# Patient Record
Sex: FEMALE | Race: ASIAN | NOT HISPANIC OR LATINO | Employment: FULL TIME | URBAN - METROPOLITAN AREA
[De-identification: names, ages, dates, MRNs, and addresses within clinical notes are randomized per-mention and may not be internally consistent; named-entity substitution may affect disease eponyms.]

---

## 2023-04-19 ENCOUNTER — OFFICE VISIT (OUTPATIENT)
Dept: FAMILY MEDICINE | Facility: CLINIC | Age: 48
End: 2023-04-19
Payer: COMMERCIAL

## 2023-04-19 ENCOUNTER — TELEPHONE (OUTPATIENT)
Dept: FAMILY MEDICINE | Facility: CLINIC | Age: 48
End: 2023-04-19

## 2023-04-19 ENCOUNTER — NURSE TRIAGE (OUTPATIENT)
Dept: FAMILY MEDICINE | Facility: CLINIC | Age: 48
End: 2023-04-19

## 2023-04-19 VITALS
HEIGHT: 65 IN | RESPIRATION RATE: 12 BRPM | SYSTOLIC BLOOD PRESSURE: 144 MMHG | TEMPERATURE: 97.9 F | DIASTOLIC BLOOD PRESSURE: 83 MMHG | HEART RATE: 80 BPM | BODY MASS INDEX: 25.36 KG/M2 | OXYGEN SATURATION: 98 % | WEIGHT: 152.2 LBS

## 2023-04-19 DIAGNOSIS — R51.9 RIGHT-SIDED HEADACHE: Primary | ICD-10-CM

## 2023-04-19 DIAGNOSIS — D64.9 ANEMIA, UNSPECIFIED TYPE: ICD-10-CM

## 2023-04-19 DIAGNOSIS — R03.0 ELEVATED BLOOD PRESSURE READING WITHOUT DIAGNOSIS OF HYPERTENSION: ICD-10-CM

## 2023-04-19 DIAGNOSIS — Z13.1 SCREENING FOR DIABETES MELLITUS: ICD-10-CM

## 2023-04-19 DIAGNOSIS — Z13.29 SCREENING FOR THYROID DISORDER: ICD-10-CM

## 2023-04-19 DIAGNOSIS — Z13.0 SCREENING FOR DEFICIENCY ANEMIA: ICD-10-CM

## 2023-04-19 LAB
BASOPHILS # BLD AUTO: 0 10E3/UL (ref 0–0.2)
BASOPHILS NFR BLD AUTO: 1 %
EOSINOPHIL # BLD AUTO: 0.1 10E3/UL (ref 0–0.7)
EOSINOPHIL NFR BLD AUTO: 2 %
ERYTHROCYTE [DISTWIDTH] IN BLOOD BY AUTOMATED COUNT: 18.4 % (ref 10–15)
HCT VFR BLD AUTO: 30.3 % (ref 35–47)
HGB BLD-MCNC: 8.6 G/DL (ref 11.7–15.7)
IMM GRANULOCYTES # BLD: 0 10E3/UL
IMM GRANULOCYTES NFR BLD: 0 %
LYMPHOCYTES # BLD AUTO: 1.5 10E3/UL (ref 0.8–5.3)
LYMPHOCYTES NFR BLD AUTO: 27 %
MCH RBC QN AUTO: 19.3 PG (ref 26.5–33)
MCHC RBC AUTO-ENTMCNC: 28.4 G/DL (ref 31.5–36.5)
MCV RBC AUTO: 68 FL (ref 78–100)
MONOCYTES # BLD AUTO: 0.4 10E3/UL (ref 0–1.3)
MONOCYTES NFR BLD AUTO: 7 %
NEUTROPHILS # BLD AUTO: 3.4 10E3/UL (ref 1.6–8.3)
NEUTROPHILS NFR BLD AUTO: 63 %
PLATELET # BLD AUTO: 340 10E3/UL (ref 150–450)
RBC # BLD AUTO: 4.45 10E6/UL (ref 3.8–5.2)
WBC # BLD AUTO: 5.5 10E3/UL (ref 4–11)

## 2023-04-19 PROCEDURE — 99204 OFFICE O/P NEW MOD 45 MIN: CPT | Performed by: PHYSICIAN ASSISTANT

## 2023-04-19 PROCEDURE — 82728 ASSAY OF FERRITIN: CPT | Performed by: PHYSICIAN ASSISTANT

## 2023-04-19 PROCEDURE — 36415 COLL VENOUS BLD VENIPUNCTURE: CPT | Performed by: PHYSICIAN ASSISTANT

## 2023-04-19 PROCEDURE — 83550 IRON BINDING TEST: CPT | Performed by: PHYSICIAN ASSISTANT

## 2023-04-19 PROCEDURE — 83540 ASSAY OF IRON: CPT | Performed by: PHYSICIAN ASSISTANT

## 2023-04-19 PROCEDURE — 80050 GENERAL HEALTH PANEL: CPT | Performed by: PHYSICIAN ASSISTANT

## 2023-04-19 RX ORDER — SUMATRIPTAN 50 MG/1
50 TABLET, FILM COATED ORAL
Qty: 9 TABLET | Refills: 0 | Status: SHIPPED | OUTPATIENT
Start: 2023-04-19

## 2023-04-19 ASSESSMENT — PAIN SCALES - GENERAL: PAINLEVEL: SEVERE PAIN (6)

## 2023-04-19 ASSESSMENT — ENCOUNTER SYMPTOMS: HEADACHES: 1

## 2023-04-19 NOTE — TELEPHONE ENCOUNTER
Phone call to patient - no answer. Left message to call back  hgb was only 8.6.   This is very low.  Start over the counter iron supplement twice a day with meals and recheck labs in 2 days- schedule lab only appointment in 2 days   Go to emergency department if lightheadedness, dizziness, or other change in symptoms.

## 2023-04-19 NOTE — TELEPHONE ENCOUNTER
This writer attempted to contact patient on 04/19/23      Reason for call results and left message.      If patient calls back:   Registered Nurse called. Relay provider message below      BRIAN Connors, RN  [facility]

## 2023-04-19 NOTE — TELEPHONE ENCOUNTER
Triaged headache symptoms, see below. Protocol advises to be seen today or tomorrow. Pt and daughter were agreeable to schedule with same day provider this afternoon. They had no further questions or concerns.     Reason for Disposition    Unexplained headache that is present > 24 hours    Additional Information    Negative: Difficult to awaken or acting confused (e.g., disoriented, slurred speech)    Negative: Weakness of the face, arm or leg on one side of the body and new-onset    Negative: Numbness of the face, arm or leg on one side of the body and new-onset    Negative: Loss of speech or garbled speech and new-onset    Negative: Passed out (i.e., fainted, collapsed and was not responding)    Negative: Sounds like a life-threatening emergency to the triager    Negative: Followed a head injury within last 3 days    Negative: Traumatic Brain Injury (TBI) is suspected    Negative: Sinus pain of forehead and yellow or green nasal discharge    Negative: Pregnant    Negative: Unable to walk without falling    Negative: Stiff neck (can't touch chin to chest)    Negative: Possibility of carbon monoxide exposure    Negative: SEVERE headache, states 'worst headache' of life    Negative: SEVERE headache, sudden-onset (i.e., reaching maximum intensity within seconds to 1 hour)    Negative: Severe pain in one eye    Negative: Loss of vision or double vision  (Exception: Same as prior migraines.)    Negative: Patient sounds very sick or weak to the triager    Negative: Fever > 103 F (39.4 C)    Negative: Fever > 100.0 F (37.8 C) and has diabetes mellitus or a weak immune system (e.g., HIV positive, cancer chemotherapy, organ transplant, splenectomy, chronic steroids)    Negative: SEVERE headache (e.g., excruciating) and has had severe headaches before    Negative: SEVERE headache and not relieved by pain meds    Negative: SEVERE headache and vomiting    Negative: SEVERE headache and fever    Negative: New headache and weak  "immune system (e.g., HIV positive, cancer chemo, splenectomy, organ transplant, chronic steroids)    Negative: Fever present > 3 days (72 hours)    Negative: Patient wants to be seen    Answer Assessment - Initial Assessment Questions  1. LOCATION: \"Where does it hurt?\"         Near the right ear    2. ONSET: \"When did the headache start?\" (Minutes, hours or days)         Started about 10 days ago    3. PATTERN: \"Does the pain come and go, or has it been constant since it started?\"        Come and goes    4. SEVERITY: \"How bad is the pain?\" and \"What does it keep you from doing?\"  (e.g., Scale 1-10; mild, moderate, or severe)    - MILD (1-3): doesn't interfere with normal activities     - MODERATE (4-7): interferes with normal activities or awakens from sleep     - SEVERE (8-10): excruciating pain, unable to do any normal activities           6/10    5. RECURRENT SYMPTOM: \"Have you ever had headaches before?\" If Yes, ask: \"When was the last time?\" and \"What happened that time?\"         Never had this before. Not taken anything for the pain yet.    6. CAUSE: \"What do you think is causing the headache?\"        Unsure    7. MIGRAINE: \"Have you been diagnosed with migraine headaches?\" If Yes, ask: \"Is this headache similar?\"         No    8. HEAD INJURY: \"Has there been any recent injury to the head?\"         No    9. OTHER SYMPTOMS: \"Do you have any other symptoms?\" (fever, stiff neck, eye pain, sore throat, cold symptoms)        Initially she had ear pain in both but this has been going on much longer and this headache.    10. PREGNANCY: \"Is there any chance you are pregnant?\" \"When was your last menstrual period?\"          NA    Protocols used: HEADACHE-A-OH    SEE IN OFFICE TODAY OR TOMORROW:   * You need to be examined. Let me give you an appointment.  * IF NO AVAILABLE OFFICE APPOINTMENTS: You need to be seen in an Urgent Care Center. Go there today. A nearby Urgent Care Center is often a good source of care.    "     Patient/Caregiver understands and will follow care advice? Yes, plans to follow advice     Kena SWIFT RN  [facility]

## 2023-04-19 NOTE — LETTER
April 20, 2023      Deanne Nix  [address] [place] [place] [address]           Dear Deanne  Your iron stores are very low.  You are anemic and need iron supplementation.  Recheck cbc (hemoglobin) in 2 days.  Follow up with primary care provider within the month.   Your blood sugar is a bit high but not concerning since it is not a fasting specimen.  Your thyroid function was normal.  Return urgently if any change in symptoms.    Please call or MyChart my office with any questions or concerns.   Rose Price, PAC    Resulted Orders   TSH with free T4 reflex   Result Value Ref Range    TSH 1.73 0.40 - 4.00 mU/L   Comprehensive metabolic panel (BMP + Alb, Alk Phos, ALT, AST, Total. Bili, TP)   Result Value Ref Range    Sodium 139 133 - 144 mmol/L    Potassium 4.6 3.4 - 5.3 mmol/L    Chloride 112 (H) 94 - 109 mmol/L    Carbon Dioxide (CO2) 25 20 - 32 mmol/L    Anion Gap 2 (L) 3 - 14 mmol/L    Urea Nitrogen 7 7 - 30 mg/dL    Creatinine 0.64 0.52 - 1.04 mg/dL    Calcium 8.9 8.5 - 10.1 mg/dL    Glucose 132 (H) 70 - 99 mg/dL    Alkaline Phosphatase 93 40 - 150 U/L    AST 19 0 - 45 U/L    ALT 22 0 - 50 U/L    Protein Total 7.9 6.8 - 8.8 g/dL    Albumin 4.0 3.4 - 5.0 g/dL    Bilirubin Total 1.1 0.2 - 1.3 mg/dL    GFR Estimate >90 >60 mL/min/1.73m2      Comment:      eGFR calculated using 2021 CKD-EPI equation.   CBC with platelets and differential   Result Value Ref Range    WBC Count 5.5 4.0 - 11.0 10e3/uL    RBC Count 4.45 3.80 - 5.20 10e6/uL    Hemoglobin 8.6 (L) 11.7 - 15.7 g/dL    Hematocrit 30.3 (L) 35.0 - 47.0 %    MCV 68 (L) 78 - 100 fL    MCH 19.3 (L) 26.5 - 33.0 pg    MCHC 28.4 (L) 31.5 - 36.5 g/dL    RDW 18.4 (H) 10.0 - 15.0 %    Platelet Count 340 150 - 450 10e3/uL    % Neutrophils 63 %    % Lymphocytes 27 %    % Monocytes 7 %    % Eosinophils 2 %    % Basophils 1 %    % Immature Granulocytes 0 %    Absolute Neutrophils 3.4 1.6 - 8.3 10e3/uL    Absolute Lymphocytes 1.5 0.8 - 5.3 10e3/uL     Absolute Monocytes 0.4 0.0 - 1.3 10e3/uL    Absolute Eosinophils 0.1 0.0 - 0.7 10e3/uL    Absolute Basophils 0.0 0.0 - 0.2 10e3/uL    Absolute Immature Granulocytes 0.0 <=0.4 10e3/uL   Iron and iron binding capacity   Result Value Ref Range    Iron 11 (L) 35 - 180 ug/dL    Iron Binding Capacity 478 (H) 240 - 430 ug/dL    Iron Sat Index 2 (L) 15 - 46 %   Ferritin   Result Value Ref Range    Ferritin 2 (L) 8 - 252 ng/mL       If you have any questions or concerns, please call the clinic at the number listed above.       Sincerely,      Rose Price PA-C

## 2023-04-19 NOTE — TELEPHONE ENCOUNTER
General Call    Contacts       Type Contact Phone/Fax    04/19/2023 11:53 AM CDT Phone (Incoming) Deanne Nix (Self) [phone] (M)        Reason for Call:  Daughter had appt here in .  She states her mom has head pain for a week.  Wondering if should have appt or go to urgent care   Pt is new has appt scheduled for well visit in May in [place]    What are your questions or concerns:  na    Date of last appointment with provider: na    Okay to leave a detailed message?: Yes at Cell number on file:    Telephone Information:   Mobile [phone]     Or call daughter at [phone]

## 2023-04-19 NOTE — PATIENT INSTRUCTIONS
Take imitrex at onset of the headache and repeat in 2 hours if not helpful   Go to emergency department  if any change in symptoms if any change in symptoms like vision changes, numbness, tingling or other change in symptoms  Follow up with us if symptoms not improving over the next week.   Schedule appointment for physical and recheck blood pressure in one month

## 2023-04-19 NOTE — RESULT ENCOUNTER NOTE
Phone call to patient - no answer left message to call back.  Hgb is very low and anemic - start iron supplement twice a day with meals and recheck hgb in 2 days lab only appointment

## 2023-04-19 NOTE — LETTER
April 21, 2023      Deanne Nix  [address] [place] [address]          Dear Deanne   Your iron stores are very low.  You are anemic and need iron supplementation.  Recheck cbc (hemoglobin) in 2 days.   Follow up with primary care provider within the month.   Your blood sugar is a bit high but not concerning since it is not a fasting specimen.   Your thyroid function was normal.   Return urgently if any change in symptoms.     Please call or MyChart my office with any questions or concerns.   Rose Price, PAC    Resulted Orders   TSH with free T4 reflex   Result Value Ref Range    TSH 1.73 0.40 - 4.00 mU/L   Comprehensive metabolic panel (BMP + Alb, Alk Phos, ALT, AST, Total. Bili, TP)   Result Value Ref Range    Sodium 139 133 - 144 mmol/L    Potassium 4.6 3.4 - 5.3 mmol/L    Chloride 112 (H) 94 - 109 mmol/L    Carbon Dioxide (CO2) 25 20 - 32 mmol/L    Anion Gap 2 (L) 3 - 14 mmol/L    Urea Nitrogen 7 7 - 30 mg/dL    Creatinine 0.64 0.52 - 1.04 mg/dL    Calcium 8.9 8.5 - 10.1 mg/dL    Glucose 132 (H) 70 - 99 mg/dL    Alkaline Phosphatase 93 40 - 150 U/L    AST 19 0 - 45 U/L    ALT 22 0 - 50 U/L    Protein Total 7.9 6.8 - 8.8 g/dL    Albumin 4.0 3.4 - 5.0 g/dL    Bilirubin Total 1.1 0.2 - 1.3 mg/dL    GFR Estimate >90 >60 mL/min/1.73m2      Comment:      eGFR calculated using 2021 CKD-EPI equation.   CBC with platelets and differential   Result Value Ref Range    WBC Count 5.5 4.0 - 11.0 10e3/uL    RBC Count 4.45 3.80 - 5.20 10e6/uL    Hemoglobin 8.6 (L) 11.7 - 15.7 g/dL    Hematocrit 30.3 (L) 35.0 - 47.0 %    MCV 68 (L) 78 - 100 fL    MCH 19.3 (L) 26.5 - 33.0 pg    MCHC 28.4 (L) 31.5 - 36.5 g/dL    RDW 18.4 (H) 10.0 - 15.0 %    Platelet Count 340 150 - 450 10e3/uL    % Neutrophils 63 %    % Lymphocytes 27 %    % Monocytes 7 %    % Eosinophils 2 %    % Basophils 1 %    % Immature Granulocytes 0 %    Absolute Neutrophils 3.4 1.6 - 8.3 10e3/uL    Absolute Lymphocytes 1.5 0.8 - 5.3 10e3/uL     Absolute Monocytes 0.4 0.0 - 1.3 10e3/uL    Absolute Eosinophils 0.1 0.0 - 0.7 10e3/uL    Absolute Basophils 0.0 0.0 - 0.2 10e3/uL    Absolute Immature Granulocytes 0.0 <=0.4 10e3/uL   Iron and iron binding capacity   Result Value Ref Range    Iron 11 (L) 35 - 180 ug/dL    Iron Binding Capacity 478 (H) 240 - 430 ug/dL    Iron Sat Index 2 (L) 15 - 46 %   Ferritin   Result Value Ref Range    Ferritin 2 (L) 8 - 252 ng/mL

## 2023-04-19 NOTE — PROGRESS NOTES
"  Assessment & Plan     Right-sided headache  Trial of imitrex as needed and follow up with us if symptoms not improving or any change in symptoms.   No headache at this time.  Considered muscle tension headache, migraine, aneurysm, bleed.  Did not feel imaging needed since no headache at this time but consider imaging if no improvement or change in symptoms  Concerning is that headache has awakened from sleep but normal neuro exam and no headache at this time   - SUMAtriptan (IMITREX) 50 MG tablet  Dispense: 9 tablet; Refill: 0    Anemia, unspecified type  hgb 8.6.  She is vegan.  No melena or hematochezia. Has regular menses   Recheck cbc in couple days.  Start on iron supplementation- see phone note   - Iron and iron binding capacity  - Ferritin  - CBC with platelets and differential  - Iron and iron binding capacity  - Ferritin    Screening for diabetes mellitus    - Comprehensive metabolic panel (BMP + Alb, Alk Phos, ALT, AST, Total. Bili, TP)  - Comprehensive metabolic panel (BMP + Alb, Alk Phos, ALT, AST, Total. Bili, TP)    Screening for thyroid disorder    - TSH with free T4 reflex  - TSH with free T4 reflex    Screening for deficiency anemia    - CBC with platelets and differential  - CBC with platelets and differential    Elevated blood pressure reading without diagnosis of hypertension  Blood pressure high here today. She denies history of hypertension but has not seen provider in [place]   - Comprehensive metabolic panel (BMP + Alb, Alk Phos, ALT, AST, Total. Bili, TP)  - Comprehensive metabolic panel (BMP + Alb, Alk Phos, ALT, AST, Total. Bili, TP)      Review of the result(s) of each unique test - cbc  Ordering of each unique test  Prescription drug management         BMI:   Estimated body mass index is 25.05 kg/m  as calculated from the following:    Height as of this encounter: 1.66 m (5' 5.35\").    Weight as of this encounter: 69 kg (152 lb 3.2 oz).   Weight management plan: Discussed healthy diet and " "exercise guidelines    Patient Instructions   Take imitrex at onset of the headache and repeat in 2 hours if not helpful   Go to emergency department  if any change in symptoms if any change in symptoms like vision changes, numbness, tingling or other change in symptoms  Follow up with us if symptoms not improving over the next week.   Schedule appointment for physical and recheck blood pressure in one month      OSCAR Conrad [facility] YVES [place] is a 48 year old, presenting for the following health issues:  Headache         View : No data to display.              Headache     History of Present Illness       Headaches:   Since the patient's last clinic visit, headaches are: worsened  The patient is getting headaches:  2 times a day  She is able to do normal daily activities when she has a migraine.  The patient is taking the following rescue/relief medications:  No rescue/relief medications   Patient states \"I get no relief\" from the rescue/relief medications.   The patient is taking the following medications to prevent migraines:  No medications to prevent migraines  In the past 4 weeks, the patient has gone to an Urgent Care or Emergency Room 0 times times due to headaches.    She eats 2-3 servings of fruits and vegetables daily.She consumes 0 sweetened beverage(s) daily.She exercises with enough effort to increase her heart rate 20 to 29 minutes per day.  She exercises with enough effort to increase her heart rate 3 or less days per week.   She is taking medications regularly.     Said it feels like ear pain  Patient new to the clinic presents with right sided Headache for 10 days - feels like bursting inside. Right occipital and around right ear.  Headache approximately twice a day. Comes on at night and does awaken from sleep but lasts 15 minutes and improves. Does not have a primary care provider.  Goes back to [place] and sees a provider there but has not " "kept up on regular health care.   is it ear or inside of head   Sometimes I hear sound of heart beat in right ear.    2 yrs ago heard a heartbeat in ear.   No nausea or vomiting. No vision changes or sense that headache is going to ocme on . 2   When I sit and meditation.  As above usually lasts 15 minutes then improved but last night lasted 2 hours continuously   coulnd't sleep.  Woke her up from sleep.  No family history of migraines.  No history of headache   No medicaitons.  No hospitalizations other than vaginal deliveries 25 year old   Gets pain and wants to massage area but not helpful    Rates pain 6/10 - but goes away at time   Came at 2 aM.  Usually comes during the nighttime no headache right now   Morning   Has noted some Wind sounds.  Thought was coming from outside - but wonders if from inside of ear  Few seconds 1-2 seconds wind sounds - listen to music  Not lightheaded.   Denies stressors or anxiety.  Is an   Has not taken anything for pain        Review of Systems   Neurological: Positive for headaches.      Constitutional, HEENT, cardiovascular, pulmonary, GI, , musculoskeletal, neuro, skin, endocrine and psych systems are negative, except as otherwise noted.      Objective    BP (!) 160/87 (BP Location: Right arm, Patient Position: Sitting, Cuff Size: Adult Regular)   Pulse 71   Temp 97.9  F (36.6  C) (Oral)   Resp 12   Ht 1.66 m (5' 5.35\")   Wt 69 kg (152 lb 3.2 oz)   SpO2 98%   BMI 25.05 kg/m    There is no height or weight on file to calculate BMI.  Physical Exam   GENERAL: healthy, alert and no distress  EYES: Eyes grossly normal to inspection, PERRL and conjunctivae and sclerae normal  HENT: ear canals and TM's normal, nose and mouth without ulcers or lesions  NECK: no adenopathy, no asymmetry, masses, or scars and thyroid normal to palpation  RESP: lungs clear to auscultation - no rales, rhonchi or wheezes  CV: regular rate and rhythm, normal S1 S2, no S3 or S4, no " murmur, click or rub, no peripheral edema and peripheral pulses strong  ABDOMEN: soft, nontender, no hepatosplenomegaly, no masses and bowel sounds normal  MS: no gross musculoskeletal defects noted, no edema  NEURO: Normal strength and tone, sensory exam grossly normal, mentation intact, cranial nerves 2-12 intact, DTR's normal and symmetric bilaterally , gait normal including heel/toe/tandem walking, Romberg normal and rapid alternating movements normal  PSYCH: mentation appears normal, affect normal/bright, judgement and insight intact and appearance well groomed  LYMPH: anterior cervical: no adenopathy  posterior cervical: no adenopathy  axillary: no adenopathy  inguinal: no adenopathy  No hepato-splenomegaly    Results for orders placed or performed in visit on 04/19/23   CBC with platelets and differential     Status: Abnormal   Result Value Ref Range    WBC Count 5.5 4.0 - 11.0 10e3/uL    RBC Count 4.45 3.80 - 5.20 10e6/uL    Hemoglobin 8.6 (L) 11.7 - 15.7 g/dL    Hematocrit 30.3 (L) 35.0 - 47.0 %    MCV 68 (L) 78 - 100 fL    MCH 19.3 (L) 26.5 - 33.0 pg    MCHC 28.4 (L) 31.5 - 36.5 g/dL    RDW 18.4 (H) 10.0 - 15.0 %    Platelet Count 340 150 - 450 10e3/uL    % Neutrophils 63 %    % Lymphocytes 27 %    % Monocytes 7 %    % Eosinophils 2 %    % Basophils 1 %    % Immature Granulocytes 0 %    Absolute Neutrophils 3.4 1.6 - 8.3 10e3/uL    Absolute Lymphocytes 1.5 0.8 - 5.3 10e3/uL    Absolute Monocytes 0.4 0.0 - 1.3 10e3/uL    Absolute Eosinophils 0.1 0.0 - 0.7 10e3/uL    Absolute Basophils 0.0 0.0 - 0.2 10e3/uL    Absolute Immature Granulocytes 0.0 <=0.4 10e3/uL   CBC with platelets and differential     Status: Abnormal    Narrative    The following orders were created for panel order CBC with platelets and differential.  Procedure                               Abnormality         Status                     ---------                               -----------         ------                     CBC with platelets  and marquis.[[id number]]  Abnormal            Final result                 Please view results for these tests on the individual orders.

## 2023-04-20 LAB
ALBUMIN SERPL-MCNC: 4 G/DL (ref 3.4–5)
ALP SERPL-CCNC: 93 U/L (ref 40–150)
ALT SERPL W P-5'-P-CCNC: 22 U/L (ref 0–50)
ANION GAP SERPL CALCULATED.3IONS-SCNC: 2 MMOL/L (ref 3–14)
AST SERPL W P-5'-P-CCNC: 19 U/L (ref 0–45)
BILIRUB SERPL-MCNC: 1.1 MG/DL (ref 0.2–1.3)
BUN SERPL-MCNC: 7 MG/DL (ref 7–30)
CALCIUM SERPL-MCNC: 8.9 MG/DL (ref 8.5–10.1)
CHLORIDE BLD-SCNC: 112 MMOL/L (ref 94–109)
CO2 SERPL-SCNC: 25 MMOL/L (ref 20–32)
CREAT SERPL-MCNC: 0.64 MG/DL (ref 0.52–1.04)
FERRITIN SERPL-MCNC: 2 NG/ML (ref 8–252)
GFR SERPL CREATININE-BSD FRML MDRD: >90 ML/MIN/1.73M2
GLUCOSE BLD-MCNC: 132 MG/DL (ref 70–99)
IRON SATN MFR SERPL: 2 % (ref 15–46)
IRON SERPL-MCNC: 11 UG/DL (ref 35–180)
POTASSIUM BLD-SCNC: 4.6 MMOL/L (ref 3.4–5.3)
PROT SERPL-MCNC: 7.9 G/DL (ref 6.8–8.8)
SODIUM SERPL-SCNC: 139 MMOL/L (ref 133–144)
TIBC SERPL-MCNC: 478 UG/DL (ref 240–430)
TSH SERPL DL<=0.005 MIU/L-ACNC: 1.73 MU/L (ref 0.4–4)

## 2023-04-20 NOTE — TELEPHONE ENCOUNTER
RN called patient and relayed provider message below. Patient states that she has been on a vegan diet for the past two years. Prior to that her hgb levels were very low, reports around 5. RN provided education on importance of using the iron supplements to help raise her hgb along with her diet. Patient verbalized good understanding. RN assisted in scheduling lab appointment for 4/24/2023. No further questions or concerns.    Karen OCONNOR RN  [facility] Primary Care Triage

## 2023-04-20 NOTE — RESULT ENCOUNTER NOTE
Please send letter with lab results    Dear Deanne  Your iron stores are very low.  You are anemic and need iron supplementation.  Recheck cbc (hemoglobin) in 2 days.  Follow up with primary care provider within the month.   Your blood sugar is a bit high but not concerning since it is not a fasting specimen.  Your thyroid function was normal.  Return urgently if any change in symptoms.    Please call or MyChart my office with any questions or concerns.   Rose Price, PAC

## 2023-04-21 ENCOUNTER — TELEPHONE (OUTPATIENT)
Dept: FAMILY MEDICINE | Facility: CLINIC | Age: 48
End: 2023-04-21
Payer: COMMERCIAL

## 2023-04-21 DIAGNOSIS — R51.9 RIGHT-SIDED HEADACHE: Primary | ICD-10-CM

## 2023-04-21 NOTE — TELEPHONE ENCOUNTER
Phone call to patient Took one imitrex tablet two days ago and no longer has headache.  I noted that she was quite anemic and had recommended lab only appointment today to make sure that she did not drop further. Adamant no lightheadedness or dizziness but got period so wants to postpone lab only appointment .  No melena or hematochezia.   Routing to team to reach out to patient and schedule nonfasting lab only appointment

## 2023-04-24 NOTE — TELEPHONE ENCOUNTER
Called patient and scheduled appointment   No further needs at this time     Laila GILBERT - Visit facilitator

## 2023-05-01 ENCOUNTER — LAB (OUTPATIENT)
Dept: LAB | Facility: CLINIC | Age: 48
End: 2023-05-01
Payer: COMMERCIAL

## 2023-05-01 DIAGNOSIS — D64.9 ANEMIA, UNSPECIFIED TYPE: ICD-10-CM

## 2023-05-01 LAB
BASOPHILS # BLD AUTO: 0.1 10E3/UL (ref 0–0.2)
BASOPHILS NFR BLD AUTO: 1 %
EOSINOPHIL # BLD AUTO: 0.1 10E3/UL (ref 0–0.7)
EOSINOPHIL NFR BLD AUTO: 1 %
ERYTHROCYTE [DISTWIDTH] IN BLOOD BY AUTOMATED COUNT: 13.4 % (ref 10–15)
HCT VFR BLD AUTO: 41.5 % (ref 35–47)
HGB BLD-MCNC: 12.6 G/DL (ref 11.7–15.7)
IMM GRANULOCYTES # BLD: 0 10E3/UL
IMM GRANULOCYTES NFR BLD: 0 %
LYMPHOCYTES # BLD AUTO: 1.1 10E3/UL (ref 0.8–5.3)
LYMPHOCYTES NFR BLD AUTO: 17 %
MCH RBC QN AUTO: 31 PG (ref 26.5–33)
MCHC RBC AUTO-ENTMCNC: 30.4 G/DL (ref 31.5–36.5)
MCV RBC AUTO: 102 FL (ref 78–100)
MONOCYTES # BLD AUTO: 0.6 10E3/UL (ref 0–1.3)
MONOCYTES NFR BLD AUTO: 9 %
NEUTROPHILS # BLD AUTO: 4.9 10E3/UL (ref 1.6–8.3)
NEUTROPHILS NFR BLD AUTO: 72 %
NRBC # BLD AUTO: 0 10E3/UL
NRBC BLD AUTO-RTO: 0 /100
PLATELET # BLD AUTO: 220 10E3/UL (ref 150–450)
RBC # BLD AUTO: 4.07 10E6/UL (ref 3.8–5.2)
WBC # BLD AUTO: 6.7 10E3/UL (ref 4–11)

## 2023-05-01 PROCEDURE — 36415 COLL VENOUS BLD VENIPUNCTURE: CPT

## 2023-05-01 PROCEDURE — 85025 COMPLETE CBC W/AUTO DIFF WBC: CPT

## 2023-05-02 NOTE — RESULT ENCOUNTER NOTE
Please call and advise that hemoglobin was normal.  Continue iron supplement twice a day with meals as I have recommended.

## 2023-05-03 ENCOUNTER — TELEPHONE (OUTPATIENT)
Dept: FAMILY MEDICINE | Facility: CLINIC | Age: 48
End: 2023-05-03
Payer: COMMERCIAL

## 2023-05-03 NOTE — TELEPHONE ENCOUNTER
Called Pt relaying results as requested Pt understood but had a question on one of the supplements transferred to nurse to clarify.     Patricia TRAN Visit Facilitator

## 2023-05-03 NOTE — RESULT ENCOUNTER NOTE
Called Pt regarding results Pt understood but had a question on one of the supplements sent to JOSE RAFAEL TRAN Visit Facilitator

## 2023-08-13 ENCOUNTER — HEALTH MAINTENANCE LETTER (OUTPATIENT)
Age: 48
End: 2023-08-13

## 2024-10-06 ENCOUNTER — HEALTH MAINTENANCE LETTER (OUTPATIENT)
Age: 49
End: 2024-10-06

## 2025-08-31 ENCOUNTER — HEALTH MAINTENANCE LETTER (OUTPATIENT)
Age: 50
End: 2025-08-31